# Patient Record
Sex: MALE | Race: WHITE | NOT HISPANIC OR LATINO | URBAN - NONMETROPOLITAN AREA
[De-identification: names, ages, dates, MRNs, and addresses within clinical notes are randomized per-mention and may not be internally consistent; named-entity substitution may affect disease eponyms.]

---

## 2021-04-08 ENCOUNTER — OFFICE VISIT (OUTPATIENT)
Dept: URBAN - NONMETROPOLITAN AREA CLINIC 2 | Facility: CLINIC | Age: 21
End: 2021-04-08
Payer: COMMERCIAL

## 2021-04-08 DIAGNOSIS — D12.6 FAMILIAL POLYPOSIS: ICD-10-CM

## 2021-04-08 DIAGNOSIS — Q85.8 PEUTZ-JEGHERS SYNDROME: ICD-10-CM

## 2021-04-08 PROCEDURE — 99213 OFFICE O/P EST LOW 20 MIN: CPT | Performed by: NURSE PRACTITIONER

## 2021-04-08 RX ORDER — SODIUM, POTASSIUM,MAG SULFATES 17.5-3.13G
354ML SOLUTION, RECONSTITUTED, ORAL ORAL
Qty: 354 MILLILITER | Refills: 0 | OUTPATIENT
Start: 2021-04-08 | End: 2021-04-09

## 2021-04-08 NOTE — HPI-TODAY'S VISIT:
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  He has had polyps found in his duodenum, jejunum, as well as colon consistent with hamartomas.  He required removal of his duodenal polyp by myself.  He had a single balloon enteroscopy with removal of his jejunal polyp.  He had left colon resection for 2 large hematomas polyps in his sigmoid.  He states that he has been doing well.  He is recovered from his surgeries that were all done in 2017.  He does see occasional blood on his stool which is intermittent and scant appearing.  He is otherwise without complaints today. EP   3/20 push enteroscopy- hyperplastic gastric polyps, and 4 SB harartomas. these were all resected 3/20 Colonoscopy- multiple polyps in sigmoid that were unable to be resected due to extensive prior resections during push and risk of injury.   4/8/21 Mr Cochran is a 20 yo M with Peutz-Jeghers syndrome. Today, he is feeling well with no GI complaints. He was supposed to have a repeat colonoscopy again last year to remove polyps, but due to COVID, did not have this done. he did not have pill cam done either. he is here today to schedule these. SB

## 2021-04-22 ENCOUNTER — TELEPHONE ENCOUNTER (OUTPATIENT)
Dept: URBAN - NONMETROPOLITAN AREA CLINIC 2 | Facility: CLINIC | Age: 21
End: 2021-04-22

## 2021-04-22 ENCOUNTER — OFFICE VISIT (OUTPATIENT)
Dept: URBAN - NONMETROPOLITAN AREA CLINIC 1 | Facility: CLINIC | Age: 21
End: 2021-04-22
Payer: COMMERCIAL

## 2021-04-22 DIAGNOSIS — Q85.8 PEUTZ-JEGHERS SYNDROME: ICD-10-CM

## 2021-04-22 PROCEDURE — 91110 GI TRC IMG INTRAL ESOPH-ILE: CPT | Performed by: INTERNAL MEDICINE

## 2021-04-22 RX ORDER — SODIUM, POTASSIUM,MAG SULFATES 17.5-3.13G
354ML SOLUTION, RECONSTITUTED, ORAL ORAL
Qty: 354 MILLILITER | Refills: 0 | OUTPATIENT
Start: 2021-05-04 | End: 2021-05-05

## 2021-05-05 PROBLEM — 53633000: Status: ACTIVE | Noted: 2021-05-05

## 2021-05-25 ENCOUNTER — OFFICE VISIT (OUTPATIENT)
Dept: URBAN - NONMETROPOLITAN AREA SURGERY CENTER 1 | Facility: SURGERY CENTER | Age: 21
End: 2021-05-25
Payer: COMMERCIAL

## 2021-05-25 DIAGNOSIS — Q85.8 PEUTZ-JEGHERS POLYPS OF SMALL BOWEL: ICD-10-CM

## 2021-05-25 DIAGNOSIS — Z86.010 H/O ADENOMATOUS POLYP OF COLON: ICD-10-CM

## 2021-05-25 DIAGNOSIS — K62.1 DYSPLASTIC POLYP OF RECTUM: ICD-10-CM

## 2021-05-25 DIAGNOSIS — D12.4 ADENOMA OF DESCENDING COLON: ICD-10-CM

## 2021-05-25 PROCEDURE — G8907 PT DOC NO EVENTS ON DISCHARG: HCPCS | Performed by: INTERNAL MEDICINE

## 2021-05-25 PROCEDURE — 45380 COLONOSCOPY AND BIOPSY: CPT | Performed by: INTERNAL MEDICINE

## 2021-05-25 PROCEDURE — 45385 COLONOSCOPY W/LESION REMOVAL: CPT | Performed by: INTERNAL MEDICINE

## 2021-05-28 ENCOUNTER — TELEPHONE ENCOUNTER (OUTPATIENT)
Dept: URBAN - METROPOLITAN AREA CLINIC 92 | Facility: CLINIC | Age: 21
End: 2021-05-28

## 2021-06-24 ENCOUNTER — OFFICE VISIT (OUTPATIENT)
Dept: URBAN - NONMETROPOLITAN AREA CLINIC 2 | Facility: CLINIC | Age: 21
End: 2021-06-24

## 2021-08-05 ENCOUNTER — WEB ENCOUNTER (OUTPATIENT)
Dept: URBAN - NONMETROPOLITAN AREA CLINIC 2 | Facility: CLINIC | Age: 21
End: 2021-08-05

## 2021-08-05 ENCOUNTER — OFFICE VISIT (OUTPATIENT)
Dept: URBAN - NONMETROPOLITAN AREA CLINIC 2 | Facility: CLINIC | Age: 21
End: 2021-08-05
Payer: COMMERCIAL

## 2021-08-05 VITALS
WEIGHT: 221 LBS | DIASTOLIC BLOOD PRESSURE: 81 MMHG | TEMPERATURE: 97.1 F | HEART RATE: 64 BPM | HEIGHT: 70 IN | SYSTOLIC BLOOD PRESSURE: 128 MMHG | BODY MASS INDEX: 31.64 KG/M2

## 2021-08-05 DIAGNOSIS — Q85.8 PEUTZ-JEGHERS SYNDROME: ICD-10-CM

## 2021-08-05 DIAGNOSIS — D12.6 FAMILIAL POLYPOSIS: ICD-10-CM

## 2021-08-05 PROBLEM — 54411001: Status: ACTIVE | Noted: 2021-04-08

## 2021-08-05 PROCEDURE — 99213 OFFICE O/P EST LOW 20 MIN: CPT | Performed by: INTERNAL MEDICINE

## 2021-08-05 NOTE — HPI-TODAY'S VISIT:
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  He has had polyps found in his duodenum, jejunum, as well as colon consistent with hamartomas.  He required removal of his duodenal polyp by myself.  He had a single balloon enteroscopy with removal of his jejunal polyp.  He had left colon resection for 2 large hematomas polyps in his sigmoid.  He states that he has been doing well.  He is recovered from his surgeries that were all done in 2017.  He does see occasional blood on his stool which is intermittent and scant appearing.  He is otherwise without complaints today. EP   3/20 push enteroscopy- hyperplastic gastric polyps, and 4 SB harartomas. these were all resected 3/20 Colonoscopy- multiple polyps in sigmoid that were unable to be resected due to extensive prior resections during push and risk of injury.  4/2021 Pill cam polyps in sb near colon  5/25/21 Colonoscopy with 6 polyps (1 harartoma and 1 TA)   8/5/21  Mr Cochran is a 20 yo M with Peutz-Jeghers syndrome. Today, he is feeling well with no GI complaints. He is scheduled for a double balloon with [place] later this month and they are planning to repeat his colonoscopy after that. SB

## 2023-10-19 ENCOUNTER — LAB OUTSIDE AN ENCOUNTER (OUTPATIENT)
Dept: URBAN - NONMETROPOLITAN AREA CLINIC 2 | Facility: CLINIC | Age: 23
End: 2023-10-19

## 2023-10-19 ENCOUNTER — OFFICE VISIT (OUTPATIENT)
Dept: URBAN - NONMETROPOLITAN AREA CLINIC 2 | Facility: CLINIC | Age: 23
End: 2023-10-19
Payer: COMMERCIAL

## 2023-10-19 VITALS
WEIGHT: 236 LBS | SYSTOLIC BLOOD PRESSURE: 141 MMHG | HEIGHT: 70 IN | HEART RATE: 71 BPM | TEMPERATURE: 98.3 F | DIASTOLIC BLOOD PRESSURE: 80 MMHG | BODY MASS INDEX: 33.79 KG/M2

## 2023-10-19 DIAGNOSIS — D12.6 FAMILIAL POLYPOSIS: ICD-10-CM

## 2023-10-19 PROCEDURE — 99214 OFFICE O/P EST MOD 30 MIN: CPT | Performed by: NURSE PRACTITIONER

## 2023-10-19 RX ORDER — SODIUM PICOSULFATE, MAGNESIUM OXIDE, AND ANHYDROUS CITRIC ACID 10; 3.5; 12 MG/160ML; G/160ML; G/160ML
160ML LIQUID ORAL
Qty: 1 BOX | Refills: 0 | OUTPATIENT
Start: 2023-10-19 | End: 2023-10-20

## 2023-10-19 NOTE — HPI-TODAY'S VISIT:
Mr Cochran is a 20 yo M with Peutz-Jeghers syndrome. He has been evaluated by [facility] and recommended to have push enteroscopy/colonoscopy q 2 years and VCE to follow. He is here to schedule his repeat push/colonoscopy. no GI complaints. sb  3/20 push enteroscopy- hyperplastic gastric polyps, and 4 SB harartomas. these were all resected 3/20 Colonoscopy- multiple polyps in sigmoid that were unable to be resected due to extensive prior resections during push and risk of injury.  4/2021 Pill cam polyps in sb near colon  5/25/21 Colonoscopy with 6 polyps (1 harartoma and 1 TA)  2021 a double balloon with [place]

## 2023-12-15 ENCOUNTER — OFFICE VISIT (OUTPATIENT)
Dept: URBAN - NONMETROPOLITAN AREA SURGERY CENTER 1 | Facility: SURGERY CENTER | Age: 23
End: 2023-12-15
Payer: COMMERCIAL

## 2023-12-15 ENCOUNTER — TELEPHONE ENCOUNTER (OUTPATIENT)
Dept: URBAN - NONMETROPOLITAN AREA CLINIC 2 | Facility: CLINIC | Age: 23
End: 2023-12-15

## 2023-12-15 ENCOUNTER — LAB OUTSIDE AN ENCOUNTER (OUTPATIENT)
Dept: URBAN - NONMETROPOLITAN AREA CLINIC 2 | Facility: CLINIC | Age: 23
End: 2023-12-15

## 2023-12-15 DIAGNOSIS — K63.5 BENIGN COLON POLYP: ICD-10-CM

## 2023-12-15 DIAGNOSIS — K29.80 ACUTE DUODENITIS: ICD-10-CM

## 2023-12-15 DIAGNOSIS — Z86.010 PERSONAL HISTORY OF COLON POLYPS: ICD-10-CM

## 2023-12-15 DIAGNOSIS — Z09 ENCOUNTER FOR COLONOSCOPY FOLLOWING COLON POLYP REMOVAL: ICD-10-CM

## 2023-12-15 DIAGNOSIS — Z98.0 INTESTINAL ANASTOMOSIS PRESENT: ICD-10-CM

## 2023-12-15 DIAGNOSIS — D13.2 ADENOMA OF DUODENUM: ICD-10-CM

## 2023-12-15 DIAGNOSIS — K63.89 APPENDICITIS EPIPLOICA: ICD-10-CM

## 2023-12-15 DIAGNOSIS — Z86.010 ADENOMAS PERSONAL HISTORY OF COLONIC POLYPS: ICD-10-CM

## 2023-12-15 DIAGNOSIS — D12.2 POLYP OF ASCENDING COLON: ICD-10-CM

## 2023-12-15 DIAGNOSIS — D12.5 SIGMOID POLYP: ICD-10-CM

## 2023-12-15 DIAGNOSIS — D12.4 POLYP OF DESCENDING COLON: ICD-10-CM

## 2023-12-15 PROCEDURE — 43251 EGD REMOVE LESION SNARE: CPT | Performed by: INTERNAL MEDICINE

## 2023-12-15 PROCEDURE — 45385 COLONOSCOPY W/LESION REMOVAL: CPT | Performed by: INTERNAL MEDICINE

## 2023-12-15 PROCEDURE — 00811 ANES LWR INTST NDSC NOS: CPT | Performed by: NURSE ANESTHETIST, CERTIFIED REGISTERED

## 2023-12-15 PROCEDURE — G8907 PT DOC NO EVENTS ON DISCHARG: HCPCS | Performed by: INTERNAL MEDICINE

## 2024-01-16 ENCOUNTER — TELEPHONE ENCOUNTER (OUTPATIENT)
Dept: URBAN - NONMETROPOLITAN AREA CLINIC 2 | Facility: CLINIC | Age: 24
End: 2024-01-16

## 2024-01-17 ENCOUNTER — TELEPHONE ENCOUNTER (OUTPATIENT)
Dept: URBAN - NONMETROPOLITAN AREA CLINIC 2 | Facility: CLINIC | Age: 24
End: 2024-01-17

## 2024-01-17 ENCOUNTER — OFFICE VISIT (OUTPATIENT)
Dept: URBAN - NONMETROPOLITAN AREA CLINIC 1 | Facility: CLINIC | Age: 24
End: 2024-01-17
Payer: COMMERCIAL

## 2024-01-17 DIAGNOSIS — Q85.89 PEUTZ-JEGHERS SYNDROME: ICD-10-CM

## 2024-01-17 DIAGNOSIS — D12.6 TUBULAR ADENOMA OF COLON: ICD-10-CM

## 2024-01-17 PROCEDURE — 91110 GI TRC IMG INTRAL ESOPH-ILE: CPT | Performed by: INTERNAL MEDICINE

## 2024-02-01 ENCOUNTER — OV EP (OUTPATIENT)
Dept: URBAN - NONMETROPOLITAN AREA CLINIC 2 | Facility: CLINIC | Age: 24
End: 2024-02-01
Payer: COMMERCIAL

## 2024-02-01 VITALS
HEART RATE: 73 BPM | BODY MASS INDEX: 32.21 KG/M2 | SYSTOLIC BLOOD PRESSURE: 130 MMHG | DIASTOLIC BLOOD PRESSURE: 85 MMHG | HEIGHT: 70 IN | TEMPERATURE: 97.9 F | WEIGHT: 225 LBS

## 2024-02-01 DIAGNOSIS — D12.6 FAMILIAL POLYPOSIS: ICD-10-CM

## 2024-02-01 PROCEDURE — 99214 OFFICE O/P EST MOD 30 MIN: CPT | Performed by: NURSE PRACTITIONER

## 2024-02-01 NOTE — HPI-TODAY'S VISIT:
Mr Cochran is a 24 yo M with Peutz-Jeghers syndrome. He is pending appt with [facility] for balloon enteroscopy. no GI complaints. sb  3/20 push enteroscopy- hyperplastic gastric polyps, and 4 SB harartomas. these were all resected 3/20 Colonoscopy- multiple polyps in sigmoid that were unable to be resected due to extensive prior resections during push and risk of injury.  4/2021 Pill cam polyps in sb near colon  5/25/21 Colonoscopy with 6 polyps (1 harartoma and 1 TA)  2021 a double balloon with [facility] 12/23 EGD with haratomas and colon with hyperplastic. repeat in 1 year  1/2024 VCE two polyps in his small bowel, once in the distal part of his duodenum and one in the distal part of his ileum

## 2024-10-01 ENCOUNTER — LAB OUTSIDE AN ENCOUNTER (OUTPATIENT)
Dept: URBAN - NONMETROPOLITAN AREA CLINIC 2 | Facility: CLINIC | Age: 24
End: 2024-10-01

## 2024-10-01 ENCOUNTER — DASHBOARD ENCOUNTERS (OUTPATIENT)
Age: 24
End: 2024-10-01

## 2024-10-01 ENCOUNTER — OFFICE VISIT (OUTPATIENT)
Dept: URBAN - NONMETROPOLITAN AREA CLINIC 2 | Facility: CLINIC | Age: 24
End: 2024-10-01
Payer: COMMERCIAL

## 2024-10-01 VITALS
DIASTOLIC BLOOD PRESSURE: 81 MMHG | TEMPERATURE: 98 F | SYSTOLIC BLOOD PRESSURE: 133 MMHG | HEIGHT: 70 IN | HEART RATE: 68 BPM | BODY MASS INDEX: 32.21 KG/M2 | WEIGHT: 225 LBS

## 2024-10-01 DIAGNOSIS — Z86.0101 HISTORY OF ADENOMATOUS AND SERRATED COLON POLYPS: ICD-10-CM

## 2024-10-01 DIAGNOSIS — D12.6 FAMILIAL POLYPOSIS: ICD-10-CM

## 2024-10-01 DIAGNOSIS — Q85.8 PEUTZ-JEGHERS SYNDROME: ICD-10-CM

## 2024-10-01 PROCEDURE — 99214 OFFICE O/P EST MOD 30 MIN: CPT | Performed by: NURSE PRACTITIONER

## 2024-10-01 RX ORDER — SODIUM, POTASSIUM,MAG SULFATES 17.5-3.13G
AS DIRECTED SOLUTION, RECONSTITUTED, ORAL ORAL
Qty: 1 | Refills: 0 | OUTPATIENT
Start: 2024-10-01 | End: 2024-10-03

## 2024-10-01 NOTE — HPI-TODAY'S VISIT:
Mr Cochran is a 23 yo M with Peutz-Jeghers syndrome. no GI complaints. he is here today to schedule his annual egd/colon. did have balloon enteroscopy with [facility] this summer. results below.  sb  3/20 push enteroscopy- hyperplastic gastric polyps, and 4 SB harartomas. these were all resected 3/20 Colonoscopy- multiple polyps in sigmoid that were unable to be resected due to extensive prior resections during push and risk of injury.  4/2021 Pill cam polyps in sb near colon  5/25/21 Colonoscopy with 6 polyps (1 harartoma and 1 TA)  2021 a double balloon with [facility] 12/23 EGD with haratomas and colon with hyperplastic. repeat in 1 year  1/2024 VCE two polyps in his small bowel, once in the distal part of his duodenum and one in the distal part of his ileum 7/2024 Push enteroscopy at [place] Impression: - Normal esophagus. - A few small gastric, duodenal and jejunal polyps. - Two large jejunal polyps ranging from 15-30 mm noted in the distal jejunum. These were resected. Two clips placed for hemostasis. - Widely patent previous surgical anastomosis was found in the jejunum. There was a 10 mm polyp at the anastomosi- polyp resected and clip placed. Recommendation:

## 2024-12-03 ENCOUNTER — OFFICE VISIT (OUTPATIENT)
Dept: URBAN - NONMETROPOLITAN AREA SURGERY CENTER 1 | Facility: SURGERY CENTER | Age: 24
End: 2024-12-03
Payer: COMMERCIAL

## 2024-12-03 DIAGNOSIS — K21.00 ESOPHAGITIS, REFLUX: ICD-10-CM

## 2024-12-03 DIAGNOSIS — Z86.0100 HISTORY OF COLON POLYPS: ICD-10-CM

## 2024-12-03 DIAGNOSIS — K31.7 POLYP OF DUODENUM: ICD-10-CM

## 2024-12-03 DIAGNOSIS — K63.5 BENIGN COLON POLYP: ICD-10-CM

## 2024-12-03 DIAGNOSIS — K21.00 GASTRO-ESOPHAGEAL REFLUX DISEASE WITH ESOPHAGITIS, WITHOUT BLEEDING: ICD-10-CM

## 2024-12-03 DIAGNOSIS — Z09 ENCOUNTER FOR FOLLOW-UP EXAMINATION AFTER COMPLETED TREATMENT FOR CONDITIONS OTHER THAN MALIGNANT NEOPLASM: ICD-10-CM

## 2024-12-03 DIAGNOSIS — K31.89 OTHER DISEASES OF STOMACH AND DUODENUM: ICD-10-CM

## 2024-12-03 DIAGNOSIS — K63.5 POLYP OF DESCENDING COLON, UNSPECIFIED TYPE: ICD-10-CM

## 2024-12-03 DIAGNOSIS — K44.9 HIATAL HERNIA: ICD-10-CM

## 2024-12-03 DIAGNOSIS — Z86.0100 PERSONAL HISTORY OF COLON POLYPS, UNSPECIFIED: ICD-10-CM

## 2024-12-03 PROCEDURE — 00813 ANES UPR LWR GI NDSC PX: CPT | Performed by: NURSE ANESTHETIST, CERTIFIED REGISTERED

## 2024-12-03 PROCEDURE — 45385 COLONOSCOPY W/LESION REMOVAL: CPT | Performed by: INTERNAL MEDICINE

## 2024-12-03 PROCEDURE — 43239 EGD BIOPSY SINGLE/MULTIPLE: CPT | Performed by: INTERNAL MEDICINE
